# Patient Record
Sex: MALE | Race: WHITE | NOT HISPANIC OR LATINO | Employment: OTHER | URBAN - METROPOLITAN AREA
[De-identification: names, ages, dates, MRNs, and addresses within clinical notes are randomized per-mention and may not be internally consistent; named-entity substitution may affect disease eponyms.]

---

## 2022-04-27 ENCOUNTER — APPOINTMENT (EMERGENCY)
Dept: RADIOLOGY | Facility: HOSPITAL | Age: 47
End: 2022-04-27
Payer: COMMERCIAL

## 2022-04-27 ENCOUNTER — HOSPITAL ENCOUNTER (EMERGENCY)
Facility: HOSPITAL | Age: 47
Discharge: HOME | End: 2022-04-27
Attending: EMERGENCY MEDICINE | Admitting: EMERGENCY MEDICINE
Payer: COMMERCIAL

## 2022-04-27 VITALS
DIASTOLIC BLOOD PRESSURE: 100 MMHG | TEMPERATURE: 98.4 F | HEART RATE: 66 BPM | OXYGEN SATURATION: 99 % | BODY MASS INDEX: 28.63 KG/M2 | HEIGHT: 70 IN | SYSTOLIC BLOOD PRESSURE: 182 MMHG | RESPIRATION RATE: 20 BRPM | WEIGHT: 200 LBS

## 2022-04-27 DIAGNOSIS — S62.609B OPEN FINGER FRACTURE: Primary | ICD-10-CM

## 2022-04-27 PROCEDURE — 99285 EMERGENCY DEPT VISIT HI MDM: CPT

## 2022-04-27 PROCEDURE — 99284 EMERGENCY DEPT VISIT MOD MDM: CPT

## 2022-04-27 PROCEDURE — 90471 IMMUNIZATION ADMIN: CPT

## 2022-04-27 PROCEDURE — 64450 NJX AA&/STRD OTHER PN/BRANCH: CPT

## 2022-04-27 PROCEDURE — 73140 X-RAY EXAM OF FINGER(S): CPT

## 2022-04-27 PROCEDURE — 90715 TDAP VACCINE 7 YRS/> IM: CPT

## 2022-04-27 RX ORDER — BUPIVACAINE HYDROCHLORIDE 5 MG/ML
5 INJECTION, SOLUTION EPIDURAL; INTRACAUDAL ONCE
Status: DISCONTINUED | OUTPATIENT
Start: 2022-04-27 | End: 2022-04-27

## 2022-04-27 RX ORDER — BUPIVACAINE HYDROCHLORIDE 5 MG/ML
5 INJECTION, SOLUTION EPIDURAL; INTRACAUDAL ONCE
Status: COMPLETED | OUTPATIENT
Start: 2022-04-27 | End: 2022-04-27

## 2022-04-27 RX ORDER — CEPHALEXIN 500 MG/1
500 CAPSULE ORAL EVERY 6 HOURS SCHEDULED
Qty: 40 CAPSULE | Refills: 0 | Status: SHIPPED | OUTPATIENT
Start: 2022-04-27 | End: 2022-05-07

## 2022-04-27 RX ORDER — GINSENG 100 MG
1 CAPSULE ORAL ONCE
Status: COMPLETED | OUTPATIENT
Start: 2022-04-27 | End: 2022-04-27

## 2022-04-27 RX ADMIN — BUPIVACAINE HYDROCHLORIDE 5 ML: 5 INJECTION, SOLUTION EPIDURAL; INTRACAUDAL at 18:23

## 2022-04-27 RX ADMIN — BACITRACIN ZINC 1 SMALL APPLICATION: 500 OINTMENT TOPICAL at 18:25

## 2022-04-27 RX ADMIN — TETANUS TOXOID, REDUCED DIPHTHERIA TOXOID AND ACELLULAR PERTUSSIS VACCINE, ADSORBED 0.5 ML: 5; 2.5; 8; 8; 2.5 SUSPENSION INTRAMUSCULAR at 18:23

## 2022-04-27 NOTE — ED PROVIDER NOTES
History  Chief Complaint   Patient presents with    Crush Injury     Pt was working with a C clamp and crushed left 5th digit  denies thinners, denies LOC  Patient is a 56 YOM who sustained a crush injury on his left 5th finger from an impact  operated C-clamp approximately 3 hours ago  This happened while working on a suspension system of a vehicle, patient is a   He describes his pain as a throbbing sensation, notes the pain is significantly improved from when it initially happened  He states he has full sensation and motor in his distal fifth finger  Denies injury to any other part of his hand  None       History reviewed  No pertinent past medical history  Past Surgical History:   Procedure Laterality Date    APPENDECTOMY         History reviewed  No pertinent family history  I have reviewed and agree with the history as documented  E-Cigarette/Vaping    E-Cigarette Use Never User      E-Cigarette/Vaping Substances    Nicotine No     THC No     CBD No     Flavoring No     Other No     Unknown No      Social History     Tobacco Use    Smoking status: Never Smoker    Smokeless tobacco: Never Used   Vaping Use    Vaping Use: Never used   Substance Use Topics    Alcohol use: Not Currently     Comment: social    Drug use: Never       Review of Systems   Musculoskeletal: Positive for arthralgias (Left fifth finger DIP)  All other systems reviewed and are negative  Physical Exam  Physical Exam  Vitals and nursing note reviewed  Constitutional:       Appearance: He is well-developed  HENT:      Head: Normocephalic and atraumatic  Eyes:      Conjunctiva/sclera: Conjunctivae normal    Cardiovascular:      Rate and Rhythm: Normal rate and regular rhythm  Heart sounds: No murmur heard  Pulmonary:      Effort: Pulmonary effort is normal  No respiratory distress  Breath sounds: Normal breath sounds  Abdominal:      Palpations: Abdomen is soft  Tenderness: There is no abdominal tenderness  Musculoskeletal:      Cervical back: Neck supple  Comments: Left fifth finger swelling with approx 1cm laceration over flexor aspect of distal phalanx  Distal sensation of fifth finger intact, full ROM of 5th finger present  No swelling or tenderness present in hand or 1st-4th fingers  Full ROM and sensation in 1st-4th fingers  Radial and ulnar pulses intact  Skin:     General: Skin is warm and dry  Neurological:      General: No focal deficit present  Mental Status: He is alert and oriented to person, place, and time  Vital Signs  ED Triage Vitals [04/27/22 1649]   Temperature Pulse Respirations Blood Pressure SpO2   98 4 °F (36 9 °C) 66 20 (!) 182/100 99 %      Temp Source Heart Rate Source Patient Position - Orthostatic VS BP Location FiO2 (%)   Temporal Monitor Lying Right arm --      Pain Score       6           Vitals:    04/27/22 1649   BP: (!) 182/100   Pulse: 66   Patient Position - Orthostatic VS: Lying         Visual Acuity      ED Medications  Medications   tetanus-diphtheria-acellular pertussis (BOOSTRIX) IM injection 0 5 mL (0 5 mL Intramuscular Given 4/27/22 1823)   bupivacaine (PF) (MARCAINE) 0 5 % injection 5 mL (5 mL Injection Given 4/27/22 1823)   bacitracin topical ointment 1 small application (1 small application Topical Given 4/27/22 1825)       Diagnostic Studies  Results Reviewed     None                 XR finger fifth digit-pinkie LEFT   Final Result by Penny Otto MD (04/27 1907)      Comminuted fracture tuft of the 5th distal phalanx as above              Workstation performed: [id number]                    Procedures  Digital Block  Performed by: Richy Smith PA-C  Authorized by: Richy Smith PA-C     Procedure date/time:  4/27/2022 5:00 PM  Consent:     Consent obtained:  Verbal    Consent given by:  Patient  Indications:     Indications:  Pain relief  Location:     Block location: Finger  Pre-procedure details:     Neurovascular status: intact      Skin preparation:  Alcohol  Procedure details (see MAR for exact dosages):     Syringe type:  Luer lock syringe    Needle gauge:  25 G    Anesthetic injected:  Bupivacaine 0 5% w/o epi    Technique: Three-sided block    Injection procedure:  Anatomic landmarks identified and negative aspiration for blood  Post-procedure details:     Outcome:  Anesthesia achieved    Patient tolerance of procedure: Tolerated well, no immediate complications             ED Course                               SBIRT 20yo+      Most Recent Value   SBIRT (22 yo +)    In order to provide better care to our patients, we are screening all of our patients for alcohol and drug use  Would it be okay to ask you these screening questions? Yes Filed at: 04/27/2022 1657   Initial Alcohol Screen: US AUDIT-C     1  How often do you have a drink containing alcohol? 0 Filed at: 04/27/2022 1657   2  How many drinks containing alcohol do you have on a typical day you are drinking? 0 Filed at: 04/27/2022 1657   3a  Male UNDER 65: How often do you have five or more drinks on one occasion? 0 Filed at: 04/27/2022 1657   3b  FEMALE Any Age, or MALE 65+: How often do you have 4 or more drinks on one occassion? 0 Filed at: 04/27/2022 1657   Audit-C Score 0 Filed at: 04/27/2022 1657   SHASTA: How many times in the past year have you    Used an illegal drug or used a prescription medication for non-medical reasons? Never Filed at: 04/27/2022 1657                    MDM  Number of Diagnoses or Management Options  Open finger fracture: new and requires workup  Diagnosis management comments: X-ray showed acute fx  Spoke with Dr Cele Lee with hand surgery via [facility]  Advised steri strips for wound approximation, splint, keflex, office follow-up  Patient advised to avoid smoking for duration of fracture    Digital block with bupivacaine given in ED with subsequent irrigation of finger under faucet with tap water  Amount and/or Complexity of Data Reviewed  Tests in the radiology section of CPT®: ordered and reviewed  Discuss the patient with other providers: yes    Patient Progress  Patient progress: stable      Disposition  Final diagnoses:   Open finger fracture     Time reflects when diagnosis was documented in both MDM as applicable and the Disposition within this note     Time User Action Codes Description Comment    4/27/2022  6:42 PM Makeda Julien Add [[id number] [id number]] Open finger fracture       ED Disposition     ED Disposition Condition Date/Time Comment    Discharge Stable Wed Apr 27, 2022  6:42 PM Wil Morocho discharge to home/self care              Follow-up Information     Follow up With Specialties Details Why [address], DO Orthopedic Surgery   [address] [facility], [address] [place] [id number] [phone]            Discharge Medication List as of 4/27/2022  6:46 PM      START taking these medications    Details   cephalexin (KEFLEX) 500 mg capsule Take 1 capsule (500 mg total) by mouth every 6 (six) hours for 10 days, Starting Wed 4/27/2022, Until Sat 5/7/2022, Normal                 PDMP Review     None          ED Provider  Electronically Signed by           Moses Rome PA-C  04/27/22 9465       Moses Rome PA-C  04/27/22 1331